# Patient Record
Sex: FEMALE | Race: BLACK OR AFRICAN AMERICAN | NOT HISPANIC OR LATINO | URBAN - NONMETROPOLITAN AREA
[De-identification: names, ages, dates, MRNs, and addresses within clinical notes are randomized per-mention and may not be internally consistent; named-entity substitution may affect disease eponyms.]

---

## 2019-07-16 ENCOUNTER — IMPORTED ENCOUNTER (OUTPATIENT)
Dept: URBAN - NONMETROPOLITAN AREA CLINIC 1 | Facility: CLINIC | Age: 59
End: 2019-07-16

## 2019-07-16 PROBLEM — H26.063: Noted: 2019-07-16

## 2019-07-16 PROBLEM — E11.9: Noted: 2017-01-31

## 2019-07-16 PROBLEM — H35.373: Noted: 2019-07-16

## 2019-07-16 PROCEDURE — 92015 DETERMINE REFRACTIVE STATE: CPT

## 2019-07-16 PROCEDURE — 92014 COMPRE OPH EXAM EST PT 1/>: CPT

## 2019-07-16 NOTE — PATIENT DISCUSSION
DM s DR-Stressed the importance of keeping blood sugars under control blood pressure under control and weight normalization and regular visits with PCP. -Explained the possible effects of poorly controlled diabetes and the damage that diabetes can cause to ocular health. -Patient to check HgbA1C.-Pt instructed to contact our office with any vision changes. Cataract(s)-Visually significant.-Cataract(s) causing symptomatic impairment of visual function not correctable with a tolerable change in glasses or contact lenses lighting or non-operative means resulting in specific activity limitations and/or participation restrictions including but not limited to reading viewing television driving or meeting vocational or recreational needs. -Expectation is clearer vision and reduced glare disability after cataract removal.-Pt will call when ready to schedEpiretinal membrane-Discussed findings of exam in detail with the patient.-Discussed the signs of worsening of the disease.

## 2020-03-19 ENCOUNTER — IMPORTED ENCOUNTER (OUTPATIENT)
Dept: URBAN - NONMETROPOLITAN AREA CLINIC 1 | Facility: CLINIC | Age: 60
End: 2020-03-19

## 2020-03-19 PROCEDURE — 92014 COMPRE OPH EXAM EST PT 1/>: CPT

## 2020-03-19 PROCEDURE — 92015 DETERMINE REFRACTIVE STATE: CPT

## 2022-04-10 ASSESSMENT — TONOMETRY
OS_IOP_MMHG: 20
OD_IOP_MMHG: 20
OS_IOP_MMHG: 23
OD_IOP_MMHG: 22

## 2022-04-10 ASSESSMENT — VISUAL ACUITY
OS_CC: J1
OD_CC: J1
OD_SC: 20/30
OD_CC: J1
OS_SC: 20/25
OD_SC: 20/30-1
OS_SC: 20/30
OS_CC: J1